# Patient Record
Sex: MALE | Race: WHITE | Employment: OTHER | URBAN - METROPOLITAN AREA
[De-identification: names, ages, dates, MRNs, and addresses within clinical notes are randomized per-mention and may not be internally consistent; named-entity substitution may affect disease eponyms.]

---

## 2022-11-03 ENCOUNTER — OFFICE VISIT (OUTPATIENT)
Dept: ORTHOPEDIC SURGERY | Age: 67
End: 2022-11-03
Payer: MEDICARE

## 2022-11-03 ENCOUNTER — HOSPITAL ENCOUNTER (OUTPATIENT)
Dept: MRI IMAGING | Age: 67
Discharge: HOME | End: 2022-11-06

## 2022-11-03 DIAGNOSIS — M25.522 LEFT ELBOW PAIN: ICD-10-CM

## 2022-11-03 DIAGNOSIS — S46.312A TRICEPS TENDON RUPTURE, LEFT, INITIAL ENCOUNTER: ICD-10-CM

## 2022-11-03 DIAGNOSIS — S46.312A TRICEPS TENDON RUPTURE, LEFT, INITIAL ENCOUNTER: Primary | ICD-10-CM

## 2022-11-03 DIAGNOSIS — M25.522 LEFT ELBOW PAIN: Primary | ICD-10-CM

## 2022-11-03 PROCEDURE — 99204 OFFICE O/P NEW MOD 45 MIN: CPT | Performed by: NURSE PRACTITIONER

## 2022-11-03 PROCEDURE — 1123F ACP DISCUSS/DSCN MKR DOCD: CPT | Performed by: NURSE PRACTITIONER
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
## 2022-11-03 NOTE — PERIOP NOTE
Patient verified name and . Order for consent not found in EHR; patient verifies procedure. Type 1B surgery, phone assessment complete. Orders not received. Labs per surgeon: none  Labs per anesthesia protocol: none    Patient answered medical/surgical history questions at their best of ability. All prior to admission medications documented in [facility]. Patient instructed to take the following medications the day of surgery according to anesthesia guidelines with a small sip of water: none On the day before surgery please take Acetaminophen 1000mg in the morning and then again before bed. You may substitute for Tylenol 650 mg. Hold all vitamins 7 days prior to surgery and NSAIDS 5 days prior to surgery. Prescription meds to hold:none  Patient instructed on the following:    > Arrive at [facility], time of arrival to be called the day before by 1700  > NPO after midnight, unless otherwise indicated, including gum, mints, and ice chips  > Responsible adult must drive patient to the hospital, stay during surgery, and patient will need supervision 24 hours after anesthesia  > Use soap in shower the night before surgery and on the morning of surgery  > All piercings must be removed prior to arrival.    > Leave all valuables (money and jewelry) at home but bring insurance card and ID on DOS.   > You may be required to pay a deductible or co-pay on the day of your procedure. You can pre-pay by calling [phone] if your surgery is at the [facility] or [phone] if your surgery is at the [facility]. > Do not wear make-up, nail polish, lotions, cologne, perfumes, powders, or oil on skin. Artificial nails are not permitted. post-procedure radiography performed/no pneumothorax/central line located in the superior vena cava

## 2022-11-04 DIAGNOSIS — S46.312A TRICEPS TENDON RUPTURE, LEFT, INITIAL ENCOUNTER: Primary | ICD-10-CM

## 2022-11-07 ENCOUNTER — HOSPITAL ENCOUNTER (OUTPATIENT)
Age: 67
Setting detail: OUTPATIENT SURGERY
Discharge: HOME | End: 2022-11-07
Attending: ORTHOPAEDIC SURGERY | Admitting: ORTHOPAEDIC SURGERY
Payer: MEDICARE

## 2022-11-07 ENCOUNTER — ANESTHESIA EVENT (OUTPATIENT)
Dept: SURGERY | Age: 67
End: 2022-11-07
Payer: MEDICARE

## 2022-11-07 ENCOUNTER — ANESTHESIA (OUTPATIENT)
Dept: SURGERY | Age: 67
End: 2022-11-07
Payer: MEDICARE

## 2022-11-07 VITALS
BODY MASS INDEX: 25.9 KG/M2 | HEIGHT: 67 IN | DIASTOLIC BLOOD PRESSURE: 65 MMHG | TEMPERATURE: 98.6 F | SYSTOLIC BLOOD PRESSURE: 108 MMHG | OXYGEN SATURATION: 96 % | WEIGHT: 165 LBS | RESPIRATION RATE: 16 BRPM | HEART RATE: 57 BPM

## 2022-11-07 DIAGNOSIS — S46.312A TRICEPS TENDON RUPTURE, LEFT, INITIAL ENCOUNTER: Primary | ICD-10-CM

## 2022-11-07 PROCEDURE — C1713 ANCHOR/SCREW BN/BN,TIS/BN: HCPCS | Performed by: ORTHOPAEDIC SURGERY

## 2022-11-07 PROCEDURE — 3600000014 HC SURGERY LEVEL 4 ADDTL 15MIN: Performed by: ORTHOPAEDIC SURGERY

## 2022-11-07 PROCEDURE — 6360000002 HC RX W HCPCS: Performed by: ANESTHESIOLOGY

## 2022-11-07 PROCEDURE — 7100000001 HC PACU RECOVERY - ADDTL 15 MIN: Performed by: ORTHOPAEDIC SURGERY

## 2022-11-07 PROCEDURE — C1769 GUIDE WIRE: HCPCS | Performed by: ORTHOPAEDIC SURGERY

## 2022-11-07 PROCEDURE — 7100000000 HC PACU RECOVERY - FIRST 15 MIN: Performed by: ORTHOPAEDIC SURGERY

## 2022-11-07 PROCEDURE — 3600000004 HC SURGERY LEVEL 4 BASE: Performed by: ORTHOPAEDIC SURGERY

## 2022-11-07 PROCEDURE — 6360000002 HC RX W HCPCS: Performed by: NURSE ANESTHETIST, CERTIFIED REGISTERED

## 2022-11-07 PROCEDURE — 7100000011 HC PHASE II RECOVERY - ADDTL 15 MIN: Performed by: ORTHOPAEDIC SURGERY

## 2022-11-07 PROCEDURE — 64418 NJX AA&/STRD SPRSCAP NRV: CPT | Performed by: ANESTHESIOLOGY

## 2022-11-07 PROCEDURE — 6360000002 HC RX W HCPCS: Performed by: NURSE PRACTITIONER

## 2022-11-07 PROCEDURE — 2709999900 HC NON-CHARGEABLE SUPPLY: Performed by: ORTHOPAEDIC SURGERY

## 2022-11-07 PROCEDURE — 24342 REPAIR OF RUPTURED TENDON: CPT | Performed by: ORTHOPAEDIC SURGERY

## 2022-11-07 PROCEDURE — 3700000001 HC ADD 15 MINUTES (ANESTHESIA): Performed by: ORTHOPAEDIC SURGERY

## 2022-11-07 PROCEDURE — 2580000003 HC RX 258: Performed by: ANESTHESIOLOGY

## 2022-11-07 PROCEDURE — 7100000010 HC PHASE II RECOVERY - FIRST 15 MIN: Performed by: ORTHOPAEDIC SURGERY

## 2022-11-07 PROCEDURE — 3700000000 HC ANESTHESIA ATTENDED CARE: Performed by: ORTHOPAEDIC SURGERY

## 2022-11-07 PROCEDURE — 2720000010 HC SURG SUPPLY STERILE: Performed by: ORTHOPAEDIC SURGERY

## 2022-11-07 DEVICE — HEALICOIL KNOTLESS RGNST
Type: IMPLANTABLE DEVICE | Site: ELBOW | Status: FUNCTIONAL
Brand: HEALICOIL

## 2022-11-07 DEVICE — HEALICOIL REGENESORB 5.5 MM                                    THREADED DILATOR, DISPOSABLE
Type: IMPLANTABLE DEVICE | Site: ELBOW | Status: FUNCTIONAL
Brand: HEALICOIL

## 2022-11-07 RX ORDER — ONDANSETRON 2 MG/ML
4 INJECTION INTRAMUSCULAR; INTRAVENOUS
Status: DISCONTINUED | OUTPATIENT
Start: 2022-11-07 | End: 2022-11-07 | Stop reason: HOSPADM

## 2022-11-07 RX ORDER — SODIUM CHLORIDE, SODIUM LACTATE, POTASSIUM CHLORIDE, CALCIUM CHLORIDE 600; 310; 30; 20 MG/100ML; MG/100ML; MG/100ML; MG/100ML
100 INJECTION, SOLUTION INTRAVENOUS CONTINUOUS
Status: DISCONTINUED | OUTPATIENT
Start: 2022-11-07 | End: 2022-11-07 | Stop reason: HOSPADM

## 2022-11-07 RX ORDER — MIDAZOLAM HYDROCHLORIDE 1 MG/ML
4 INJECTION, SOLUTION INTRAMUSCULAR; INTRAVENOUS
Status: COMPLETED | OUTPATIENT
Start: 2022-11-07 | End: 2022-11-07

## 2022-11-07 RX ORDER — ROPIVACAINE HYDROCHLORIDE 10 MG/ML
INJECTION EPIDURAL; INFILTRATION; PERINEURAL PRN
Status: DISCONTINUED | OUTPATIENT
Start: 2022-11-07 | End: 2022-11-07 | Stop reason: SDUPTHER

## 2022-11-07 RX ORDER — SODIUM CHLORIDE, SODIUM LACTATE, POTASSIUM CHLORIDE, CALCIUM CHLORIDE 600; 310; 30; 20 MG/100ML; MG/100ML; MG/100ML; MG/100ML
INJECTION, SOLUTION INTRAVENOUS CONTINUOUS
Status: DISCONTINUED | OUTPATIENT
Start: 2022-11-07 | End: 2022-11-07 | Stop reason: HOSPADM

## 2022-11-07 RX ORDER — SODIUM CHLORIDE 0.9 % (FLUSH) 0.9 %
5-40 SYRINGE (ML) INJECTION PRN
Status: DISCONTINUED | OUTPATIENT
Start: 2022-11-07 | End: 2022-11-07 | Stop reason: HOSPADM

## 2022-11-07 RX ORDER — SODIUM CHLORIDE 9 MG/ML
INJECTION, SOLUTION INTRAVENOUS PRN
Status: DISCONTINUED | OUTPATIENT
Start: 2022-11-07 | End: 2022-11-07 | Stop reason: HOSPADM

## 2022-11-07 RX ORDER — MIDAZOLAM HYDROCHLORIDE 1 MG/ML
INJECTION INTRAMUSCULAR; INTRAVENOUS PRN
Status: DISCONTINUED | OUTPATIENT
Start: 2022-11-07 | End: 2022-11-07 | Stop reason: SDUPTHER

## 2022-11-07 RX ORDER — FENTANYL CITRATE 50 UG/ML
INJECTION, SOLUTION INTRAMUSCULAR; INTRAVENOUS PRN
Status: DISCONTINUED | OUTPATIENT
Start: 2022-11-07 | End: 2022-11-07 | Stop reason: SDUPTHER

## 2022-11-07 RX ORDER — OXYCODONE HYDROCHLORIDE 5 MG/1
5 TABLET ORAL EVERY 4 HOURS PRN
Qty: 28 TABLET | Refills: 0 | Status: SHIPPED | OUTPATIENT
Start: 2022-11-07 | End: 2022-11-07

## 2022-11-07 RX ORDER — OXYCODONE HYDROCHLORIDE 5 MG/1
10 TABLET ORAL PRN
Status: DISCONTINUED | OUTPATIENT
Start: 2022-11-07 | End: 2022-11-07 | Stop reason: HOSPADM

## 2022-11-07 RX ORDER — OXYCODONE HYDROCHLORIDE 5 MG/1
5 TABLET ORAL PRN
Status: DISCONTINUED | OUTPATIENT
Start: 2022-11-07 | End: 2022-11-07 | Stop reason: HOSPADM

## 2022-11-07 RX ORDER — OXYCODONE HYDROCHLORIDE 5 MG/1
5 TABLET ORAL EVERY 4 HOURS PRN
Qty: 28 TABLET | Refills: 0 | Status: SHIPPED | OUTPATIENT
Start: 2022-11-07 | End: 2022-11-12

## 2022-11-07 RX ORDER — ONDANSETRON 4 MG/1
4 TABLET, ORALLY DISINTEGRATING ORAL EVERY 8 HOURS PRN
Qty: 20 TABLET | Refills: 0 | Status: SHIPPED | OUTPATIENT
Start: 2022-11-07

## 2022-11-07 RX ORDER — SODIUM CHLORIDE 0.9 % (FLUSH) 0.9 %
5-40 SYRINGE (ML) INJECTION EVERY 12 HOURS SCHEDULED
Status: DISCONTINUED | OUTPATIENT
Start: 2022-11-07 | End: 2022-11-07 | Stop reason: HOSPADM

## 2022-11-07 RX ORDER — ONDANSETRON 4 MG/1
4 TABLET, ORALLY DISINTEGRATING ORAL EVERY 8 HOURS PRN
Qty: 20 TABLET | Refills: 0 | Status: SHIPPED | OUTPATIENT
Start: 2022-11-07 | End: 2022-11-07

## 2022-11-07 RX ORDER — FENTANYL CITRATE 50 UG/ML
100 INJECTION, SOLUTION INTRAMUSCULAR; INTRAVENOUS
Status: COMPLETED | OUTPATIENT
Start: 2022-11-07 | End: 2022-11-07

## 2022-11-07 RX ORDER — HYDROMORPHONE HYDROCHLORIDE 2 MG/ML
0.5 INJECTION, SOLUTION INTRAMUSCULAR; INTRAVENOUS; SUBCUTANEOUS EVERY 5 MIN PRN
Status: DISCONTINUED | OUTPATIENT
Start: 2022-11-07 | End: 2022-11-07 | Stop reason: HOSPADM

## 2022-11-07 RX ORDER — LIDOCAINE HYDROCHLORIDE 10 MG/ML
1 INJECTION, SOLUTION INFILTRATION; PERINEURAL
Status: DISCONTINUED | OUTPATIENT
Start: 2022-11-07 | End: 2022-11-07 | Stop reason: HOSPADM

## 2022-11-07 RX ORDER — PROPOFOL 10 MG/ML
INJECTION, EMULSION INTRAVENOUS CONTINUOUS PRN
Status: DISCONTINUED | OUTPATIENT
Start: 2022-11-07 | End: 2022-11-07 | Stop reason: SDUPTHER

## 2022-11-07 RX ADMIN — FENTANYL CITRATE 50 MCG: 50 INJECTION, SOLUTION INTRAMUSCULAR; INTRAVENOUS at 11:24

## 2022-11-07 RX ADMIN — PROPOFOL 140 MCG/KG/MIN: 10 INJECTION, EMULSION INTRAVENOUS at 10:57

## 2022-11-07 RX ADMIN — Medication 2000 MG: at 10:48

## 2022-11-07 RX ADMIN — MEPIVACAINE HYDROCHLORIDE 15 ML: 15 INJECTION, SOLUTION EPIDURAL; INFILTRATION at 09:48

## 2022-11-07 RX ADMIN — FENTANYL CITRATE 50 MCG: 50 INJECTION, SOLUTION INTRAMUSCULAR; INTRAVENOUS at 10:59

## 2022-11-07 RX ADMIN — ROPIVACAINE HYDROCHLORIDE 15 ML: 10 INJECTION, SOLUTION EPIDURAL at 09:48

## 2022-11-07 RX ADMIN — FENTANYL CITRATE 50 MCG: 50 INJECTION, SOLUTION INTRAMUSCULAR; INTRAVENOUS at 09:48

## 2022-11-07 RX ADMIN — MIDAZOLAM 3 MG: 1 INJECTION INTRAMUSCULAR; INTRAVENOUS at 09:51

## 2022-11-07 RX ADMIN — SODIUM CHLORIDE, SODIUM LACTATE, POTASSIUM CHLORIDE, AND CALCIUM CHLORIDE: 600; 310; 30; 20 INJECTION, SOLUTION INTRAVENOUS at 10:35

## 2022-11-07 RX ADMIN — MIDAZOLAM 2 MG: 1 INJECTION INTRAMUSCULAR; INTRAVENOUS at 10:48

## 2022-11-07 NOTE — ANESTHESIA PROCEDURE NOTES
Peripheral Block    Patient location during procedure: procedure area  Reason for block: post-op pain management and at surgeon's request  Start time: 11/7/2022 9:48 AM  End time: 11/7/2022 9:52 AM  Staffing  Performed: anesthesiologist   Anesthesiologist: Haleigh De La Rosa MD  Preanesthetic Checklist  Completed: patient identified, IV checked, site marked, risks and benefits discussed, surgical/procedural consents, equipment checked, pre-op evaluation, timeout performed, anesthesia consent given, oxygen available, monitors applied/VS acknowledged, fire risk safety assessment completed and verbalized and blood product R/B/A discussed and consented  Peripheral Block   Patient position: supine  Prep: ChloraPrep  Provider prep: mask and sterile gloves  Patient monitoring: cardiac monitor, continuous pulse ox, frequent blood pressure checks, IV access, oxygen and responsive to questions  Block type: Suprascapular  Laterality: left  Injection technique: single-shot  Guidance: nerve stimulator and ultrasound guided    Needle   Needle type: insulated echogenic nerve stimulator needle   Needle gauge: 20 G  Needle localization: nerve stimulator and ultrasound guidance  Needle insertion depth: 3 cm  Needle length: 5 cm  Assessment   Injection assessment: negative aspiration for heme, no paresthesia on injection, local visualized surrounding nerve on ultrasound and no intravascular symptoms  Paresthesia pain: none  Slow fractionated injection: yes  Hemodynamics: stable  Real-time US image taken/store: yes  Outcomes: uncomplicated    Additional Notes  Potential access sites were examined with ultrasound and the acceptable patent access site was selected (site noted above). The needle path and vein access were visualized in real time using ultrasonography, and an image was recorded for permanent record.      15 cc 1% Ropivacaine + 1.5% Mepivacaine with 4 mg decadron and epi  Medications Administered  dexamethasone 4 MG/ML - Perineural   4 mg - 11/7/2022 9:48:00 AM  mepivacaine 1.5 % - Perineural   15 mL - 11/7/2022 9:48:00 AM

## 2022-11-07 NOTE — INTERVAL H&P NOTE
H&P Update:  Jeronimo Fields was seen and examined. History and physical has been reviewed. The patient has been examined.  There have been no significant clinical changes since the completion of the originally dated History and Physical.    Arun Zheng MD  Orthopaedic Surgery  11/07/22  8:55 AM

## 2022-11-07 NOTE — ANESTHESIA PRE PROCEDURE
Department of Anesthesiology  Preprocedure Note       Name:  Fox Pierce   Age:  78 y.o.  :  1955                                          MRN:  [id number]         Date:  2022      Surgeon: Yasemin Gonzalez):  Anitra Lemon MD    Procedure: Procedure(s):  TRICEPS TENDON REPAIR    Medications prior to admission:   Prior to Admission medications    Not on File       Current medications:    Current Facility-Administered Medications   Medication Dose Route Frequency Provider Last Rate Last Admin    lidocaine 1 % injection 1 mL  1 mL IntraDERmal Once PRN Blayne Corcoran MD        fentaNYL (SUBLIMAZE) injection 100 mcg  100 mcg IntraVENous Once PRN Blayne Corcoran MD        lactated ringers infusion  100 mL/hr IntraVENous Continuous Blayne Corcoran MD        midazolam PF (VERSED) injection 4 mg  4 mg IntraVENous Once PRN Blayne Corcoran MD        ceFAZolin (ANCEF) 2000 mg in sterile water 20 mL IV syringe  2,000 mg IntraVENous On Call to [address], APRN - CNP        sodium chloride flush 0.9 % injection 5-40 mL  5-40 mL IntraVENous 2 times per day Zeinab Alves APRN - CNP        sodium chloride flush 0.9 % injection 5-40 mL  5-40 mL IntraVENous PRN Vera Bettencourt APRN - CNP        0.9 % sodium chloride infusion   IntraVENous PRN Yolanda Bettencourt APRN - CNP           Allergies:  No Known Allergies    Problem List:    Patient Active Problem List   Diagnosis Code    Inguinal strain J87.286V    Triceps tendon rupture, left, initial encounter Y86.027L    Left elbow pain M25.522       Past Medical History:  History reviewed. No pertinent past medical history.     Past Surgical History:        Procedure Laterality Date    APPENDECTOMY      TONSILLECTOMY         Social History:    Social History     Tobacco Use    Smoking status: Never    Smokeless tobacco: Never   Substance Use Topics    Alcohol use: Yes     Comment: 1 pint a week                                Counseling given: Not Answered      Vital Signs (Current):   Vitals:    11/03/22 1440 11/07/22 0846   BP:  119/63   Pulse:  60   Resp:  18   Temp:  98.3 °F (36.8 °C)   TempSrc:  Oral   SpO2:  97%   Weight: 165 lb (74.8 kg) 165 lb (74.8 kg)   Height: 5' 7\" (1.702 m)                                               BP Readings from Last 3 Encounters:   11/07/22 119/63       NPO Status:                                                                                 BMI:   Wt Readings from Last 3 Encounters:   11/07/22 165 lb (74.8 kg)   11/03/22 165 lb (74.8 kg)     Body mass index is 25.84 kg/m². CBC: No results found for: WBC, RBC, HGB, HCT, MCV, RDW, PLT    CMP: No results found for: NA, K, CL, CO2, BUN, CREATININE, GFRAA, AGRATIO, LABGLOM, GLUCOSE, GLU, PROT, CALCIUM, BILITOT, ALKPHOS, AST, ALT    POC Tests: No results for input(s): POCGLU, POCNA, POCK, POCCL, POCBUN, POCHEMO, POCHCT in the last 72 hours. Coags: No results found for: PROTIME, INR, APTT    HCG (If Applicable): No results found for: PREGTESTUR, PREGSERUM, HCG, HCGQUANT     ABGs: No results found for: PHART, PO2ART, BZU8DIK, DOE3KEP, BEART, Q7IMQEHP     Type & Screen (If Applicable):  No results found for: LABABO, LABRH    Drug/Infectious Status (If Applicable):  No results found for: HIV, HEPCAB    COVID-19 Screening (If Applicable): No results found for: COVID19        Anesthesia Evaluation  Patient summary reviewed  Airway: Mallampati: II  TM distance: >3 FB   Neck ROM: full  Mouth opening: > = 3 FB   Dental:          Pulmonary:Negative Pulmonary ROS and normal exam                               Cardiovascular:Negative CV ROS  Exercise tolerance: good (>4 METS),                     Neuro/Psych:   Negative Neuro/Psych ROS              GI/Hepatic/Renal: Neg GI/Hepatic/Renal ROS            Endo/Other: Negative Endo/Other ROS                    Abdominal:             Vascular: negative vascular ROS.          Other Findings:           Anesthesia Plan      TIVA     ASA 1 Anesthetic plan and risks discussed with patient.               Post-op pain plan if not by surgeon: single peripheral nerve block            Bree Willson MD   11/7/2022

## 2022-11-07 NOTE — OP NOTE
ORTHOPAEDIC SURGICAL NOTE        Adolfo Taylor male 78 y.o.  [id number]   11/7/2022     PRE-OP DIAGNOSIS: Rupture of left triceps tendon, initial encounter [S46.319A]   POST-OP DIAGNOSIS: Same  LATERALITY: Left     PROCEDURES PERFORMED:   Left distal triceps tendon repair       SURGEON:   Diandra Rao MD, PhD     IMPLANTS:   Implant Name Type Inv. Item Serial No.  Lot No. LRB No. Used Action   ANCHOR N SUT FIX 5.5 MM SZ - [id number]  ANCHOR N SUT FIX 5.5 MM SZ  [employer] [id number] Left 1 Implanted   ANCHOR SUTURE 5.5 MM KNOTLESS HEAL[employer] - [id number]  ANCHOR SUTURE 5.5 MM KNOTLESS HEALICOIL  [employer] [id number] Left 1 Implanted      Procedure(s):  TRICEPS TENDON REPAIR   Surgeon(s):  Rama Mo MD   Procedure(s):  TRICEPS TENDON REPAIR     ANESTHESIA: Regional     STAFF:    Circulator: Ruth Membreno RN  Scrub Person First: Heidy Doe     ESTIMATED BLOOD LOSS: Minimal       TOTAL IV FLUIDS : See anesthesia note    COMPLICATIONS: None     DRAINS:       TOURNIQUET TIME:   Total Tourniquet Time Documented:  Arm  (Left) - 32 minutes  Total: Arm  (Left) - 32 minutes       INDICATION FOR PROCEDURE:     Adolfo Taylor sustained a rupture of the left distal triceps tendon from the olecranon. Surgical and non-surgical treatment options were discussed with the patient and their family, as well as the risk and benefits of each option. After thorough discussion, the patient decided to proceed with surgical management. Specific to this treatment plan, we discussed in detail surgical risks including scar, pain, bleeding, infection, anesthetic risks, neurovascular injury, failure to achieve desired results, hardware problems, need for further surgery,  weakness, stiffness, risk of death and potential risk of other unforseen complication. The Patient consented to the procedure after discussion of the risks and benefits.          DESCRIPTION OF PROCEDURE:     The patient was identified in the holding room. The left elbow was marked and confirmed as the correct operative site. They were then brought to the OR and transferred onto the OR table in the supine position. All bony prominences were well padded. SCDs were placed on the bilateral legs throughout the case. A timeout was performed, verifying the correct patient, the correct side  and the correct procedure. Antibiotics were then administered, and were redosed during the procedure as needed at indicated intervals. A non-sterile tourniquet was placed on the arm. The upper extremity was pre scrubbed and then prepped and draped in routine sterile fashion. An incisional timeout was performed re-confirming the correct patient, surgical site and procedure, as well as verifying antibiotics.      A posterior incision was made on the elbow, this was curved radially to avoid pressure over the olecranon tip, Bovie dissection was carried down to the proximal ulna as well as to the triceps fascia, the triceps tendon was identified, this was retracted about 3 cm, this was debrided with a rongeur, remnants of the tendon were debrided off the tip of the olecranon, this was done with a rongeur, the triceps was released on its anterior, posterior, ulnar and radial side, this was mobilized and the triceps was able to reach the olecranon while extending the elbow, 2 [place] stitches were applied using number 2 fiber tape, a medial and lateral perforation were performed on the ulnar wall staying outside of the joint, the sutures were passed as well as a loop suture, one suture from the medial and lateral side were then repassed to the medial side, the same procedure was performed on the lateral side, this was done as a footprint tensioning technique, all 4 sutures were then secured to the posterior aspect of the ulna with a 5.0 mm [employer] screw, this was done while tensioning all the sutures and maintaining the elbow extended. At the end of the procedure the triceps demonstrated good tension and I was able to bend the elbow to 90 degrees without any displacement of the triceps from its footprint    The tourniquet was deflated and hemostasis was ensured. The wounds were then thoroughly irrigated and closed in layered fashion with 4-0 Vicryl and 4-0 staples. A sterile dressing was applied followed by a  splint     The patient was awakened and taken to PACU in stable condition. All sponge and needle counts were correct at the end of the case. I was present and scrubbed for the entire procedure. DISPOSITION:    The patient will be discharged home. Weightbearing status: NWB       CHEMICAL VTE (DVT) PROPHYLAXIS: None warranted for this case     FOLLOW-UP:  10-14 days for wound check and XRs if needed.      Penny Escalante MD    11/07/22  2:45 PM

## 2022-11-09 NOTE — ANESTHESIA POSTPROCEDURE EVALUATION
Department of Anesthesiology  Postprocedure Note    Patient: Flex Muñiz  MRN: [id number]  YOB: 1955  Date of evaluation: 11/9/2022      Procedure Summary     Date: 11/07/22 Room / Location: [facility] OP OR 06 / [facility]    Anesthesia Start: 1040 Anesthesia Stop: 0010    Procedure: TRICEPS TENDON REPAIR (Left: Elbow) Diagnosis:       Rupture of left triceps tendon, initial encounter      (Rupture of left triceps tendon, initial encounter [W50.566D])    Surgeons: Adrian Contreras MD Responsible Provider: Patricia Collins MD    Anesthesia Type: TIVA ASA Status: 1          Anesthesia Type: No value filed.     Sadie Phase I: Sadie Score: 7    Sadie Phase II: Sadie Score: 9      Anesthesia Post Evaluation    Patient location during evaluation: PACU  Patient participation: complete - patient participated  Level of consciousness: awake  Pain score: 0  Airway patency: patent  Nausea & Vomiting: no nausea and no vomiting  Complications: no  Cardiovascular status: blood pressure returned to baseline and hemodynamically stable  Respiratory status: acceptable, spontaneous ventilation and nonlabored ventilation  Hydration status: euvolemic  Multimodal analgesia pain management approach

## 2022-11-18 ENCOUNTER — OFFICE VISIT (OUTPATIENT)
Dept: ORTHOPEDIC SURGERY | Age: 67
End: 2022-11-18
Payer: MEDICARE

## 2022-11-18 DIAGNOSIS — S46.312A TRICEPS TENDON RUPTURE, LEFT, INITIAL ENCOUNTER: Primary | ICD-10-CM

## 2022-11-18 DIAGNOSIS — M25.522 LEFT ELBOW PAIN: ICD-10-CM

## 2022-11-18 PROCEDURE — 99024 POSTOP FOLLOW-UP VISIT: CPT | Performed by: NURSE PRACTITIONER

## 2022-11-18 PROCEDURE — L3760 EO ADJ JT PREFAB CUSTOM FIT: HCPCS | Performed by: NURSE PRACTITIONER

## 2022-11-18 PROCEDURE — 97760 ORTHOTIC MGMT&TRAING 1ST ENC: CPT | Performed by: OCCUPATIONAL THERAPIST

## 2022-11-18 NOTE — LETTER
DME Patient Authorization Form    Name: Helon Severs  : 1955  MRN: [id number]   Age: 78 y.o. Gender: male  Delivery Address: [facility]     Diagnosis:     ICD-10-CM    1. Triceps tendon rupture, left, initial encounter  S46.312A            Requested DME:  IROM Elbow- ($525.00) X 1 - left        Clinical Notes:     **Indicates non-covered items by insurance. Payment expected on date of service. Electronically signed by  Provider: ELVER Duran - CNP__Date: 2022                            [address] Tax ID # [id number]        Durable Medical Equipment and/or Orthotics Patient Consent     I understand that my physician has prescribed this medical supply as part of my treatment plan as a matter of Medical Necessity.  I understand that I have a choice in where I receive my prescribed orthopedic supplies and/or services.  I authorize [facility] to furnish this service/product and to provide my insurance carrier with any information requested in order to process for payment.  I instruct my insurance carrier to pay [facility] directly for these services/products.  I understand that my insurance carrier may deny payment for this supply because it is a non-covered item, deemed not medically necessary or considered experimental.   I understand that any cost not covered by my insurance carrier will be solely my financial responsibility.  I have received the Supplier Standards and have reviewed them.  I have received the prescribed item and have been fully instructed on the proper use of the above services/products.    ______ (Patient Initials) I understand that all DME items are non-returnable after being dispensed. Items still in sealed packaging may be returned up to 14 days after purchasing.  [address] will replace items that are defective.    ______ (Patient Initials) I understand that Mary Doyle will not file a claim with my insurance carrier for this service/product and I am waiving my right to file a claim on my own for this service/product with my insurance company as this item is NON-COVERED (Denoted by the **) by my Insurance company/policy. ______ (Patient Initials) I understand that I am responsible to bring my equipment to the hospital for any surgery. ______________________________________________  ________________________  Patient / Pepito Talavera            Thank you for considering [address] Your physician has prescribed specific medical equipment or devices for your home use. The following describes your rights and responsibilities as our customer. Right to Choose Providers: You have a choice regarding which company supplies your home medical equipment and devices, and to consult your physician in this decision. You may choose a medical supply store, a home medical equipment provider, or a specialist such as [facility]. [facility] will coordinate with your physician to provide the medical equipment or devices prescribed for your home use. Right to Service:  You have the right to considerate, respectful and nondiscriminatory care. You have the right to receive accurate and easily understood information about your health care. If you speak a foreign language, or don't understand the discussions, assistance will be provided to allow you to make informed health care decisions. You have the right to know your treatment options and to participate in decisions about your care, including the right to accept or refuse treatment. You have the right to expect a reasonable response to your requests for treatment or service.   You have the right to talk in confidence with health care providers and to have your health care information protected. You have the right to receive an explanation of your bill. You have the right to complain about the service or product you receive. Patient Responsibilities:  Please provide complete and accurate information about your health insurance benefits and make arrangements for the timely payment of your bill. [facility] will, if possible, assume responsibility for billing your insurance (Medicare, Medicaid and commercial) for the prescribed equipment or devices. If your policy does not cover the prescribed product, or only covers a portion of the bill, you are responsible for any remaining balance. Return and Exchange Policy:  [facility] will honor published  Warranties for products. [facility] will accept returns or exchanges within 14 days from the date of receipt, providin) the product must be in new condition; 2) receipt as required; and 3) used disposable and hygiene products may only be returned due to a defective product. Note: Refunds will be issued in a timely manner, please allow 4-6 weeks for processing. Complaint Procedures and DME Consumer Protection Resources:  [facility] values you as a customer, and is committed to resolving patient concerns. This commitment includes understanding and documenting your concerns, conducting a review of internal procedures, and providing you with an explanation and resolution to your concerns. Should you have any questions about our services or billing process, please contact our office at (practice phone number). If we are unable to resolve the concern, you have the right to direct comments to the office of Consumer Protection, in the [address] or the [facility] office, without fear of repercussion. DMEPOS SUPPLIER STANDARDS    A supplier must be in compliance with all applicable Federal and [employer] and regulatory requirements.   A supplier must provide complete and accurate information on the DMEPOS supplier application. Any changes to this information must be reported to the [facility] within 30 days. An authorized individual (one whose signature is binding) must sign the application for billing privileges. A supplier must fill orders from its own inventory, or must contract with other companies for the purchase of items necessary to fill the order. A supplier may not contract with any entity that is currently excluded from the Medicare program, any [facility] program, or from any other Federal procurement or Nonprocurement programs. A supplier must advise beneficiaries that they may rent or purchase inexpensive or routinely purchased durable medical equipment, and of the purchase option for capped rental equipment. A supplier must notify beneficiaries of warranty coverage and honor all warranties under applicable State Law, and repair or replace free of charge Medicare covered items that are under warranty. A supplier must maintain a physical facility on an appropriate site. A supplier must permit CMS, or its agents to conduct on-site inspections to ascertain the supplier's compliance with these standards. The supplier location must be accessible to beneficiaries during reasonable business hours, and must maintain a visible sign and posted hours of operation. A supplier must maintain a primary business telephone listed under the name of the business in a [employer] or a toll free number available through directory assistance. The exclusive use of a beeper, answering machine or cell phone is prohibited. A supplier must have comprehensive liability insurance in the amount of at least $300,000 that covers both the supplier's place of business and all customers and employees of the supplier. If the supplier manufactures its own items, this insurance must also cover product liability and completed operations.   A supplier must agree not to initiate telephone contact with beneficiaries, with a few exceptions allowed. This standard prohibits suppliers from calling beneficiaries in order to solicit new business. A supplier is responsible for delivery and must instruct beneficiaries on use of Medicare covered items, and maintain proof of delivery. A supplier must answer questions, and respond to complaints of the beneficiaries, and maintain documentation of such contacts. A supplier must maintain and replace at no charge or repair directly, or through a service contract with another company, Medicare covered items it has rented to beneficiaries. A supplier must accept returns of substandard (less than full quality for the particular item) or unsuitable items (inappropriate for the beneficiary at the time it was fitted and rented or sold) from beneficiaries. A supplier must disclose these supplier standards to each beneficiary to whom it supplies a Medicare-covered item. A supplier must disclose to the government any person having ownership, financial, or control interest in the supplier. A supplier must not convey or reassign a supplier number; i.e., the supplier may not sell or allow another entity to use its Medicare billing number. A supplier must have a complaint resolution protocol established to address beneficiary complaints that relate to these standards. A record of these complaints must be maintained at the physical facility. Complaint records must include: the name, address, telephone number and health insurance claim number of the beneficiary, a summary of the complaint, and any action taken to resolve it. A supplier must agree to furnish CMS any information required by the Medicare statute and implementing regulations. A supplier of DMEPOS and other items and services must be accredited by a CMS-approved accreditation organization in order to receive and retain a supplier billing number.  The accreditation must indicate the specific products and services, for which the supplier is accredited in order for the supplier to receive payment for those specific products and services. A DMEPOS supplier must notify their accreditation organization when a new DMEPOS location is opened. The accreditation organization may accredit the new supplier location for three months after it is operational without requiring a new site visit. All DMEPOS supplier locations, whether owned or subcontracted, must meet the [employer] and be separately accredited in order to bill Medicare. An accredited supplier may be denied enrollment or their enrollment may be revoked, if CMS determines that they are not in compliance with the DMEPOS quality standards. A DMEPOS supplier must disclose upon enrollment all products and services, including the addition of new product lines for which they are seeking accreditation. If a new product line is added after enrollment, the DMEPOS supplier will be responsible for notifying the accrediting body of the new product so that the DMEPOS supplier can be re-surveyed and accredited for these new products. Must meet the surety bond requirements specified in 42 C. F.R. 424.57(c). Implementation date- May 4, 2009. A supplier must obtain oxygen from a state-licensed oxygen supplier. A supplier must maintain ordering and referring documentation consistent with provisions found in 42 C. F.R. 424.516(f). DMEPOS suppliers are prohibited from sharing a practice location with certain other Medicare providers and suppliers. DMEPOS suppliers must remain open to the public for a minimum of 30 hours per week with certain exceptions.

## 2022-11-18 NOTE — PROGRESS NOTES
Patient was fitted and instructed on an IROM Elbow Brace for the left elbow. Patient read and signed documenting they understand and agree to [facility]'s current DME return policy.
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
## 2022-11-18 NOTE — PROGRESS NOTES
Orthopaedic Hand Clinic Note    Name: Navi Lewis  YOB: 1955  Gender: male  MRN: [id number]      Follow up visit:   1. Triceps tendon rupture, left, initial encounter        HPI: Navi Lewis is a 78 y.o. male who is following up for left tendon triceps repair. He is 11 days out from surgery. He is doing very well. He is not taking any pain medication. .      ROS/Meds/PSH/PMH/FH/SH: I personally reviewed the patients standard intake form. Pertinents are discussed in the HPI    Physical Examination:    Musculoskeletal Examination:  Examination on the left upper extremity demonstrates cap refill < 5 seconds in all fingers,  Patient has expected postoperative swelling and ecchymosis to the left elbow. He is Staples are intact. There is no erythema or drainage. He denies paresthesia. He has good capillary refill. .    Imaging / Electrodiagnostic Tests:     None    Assessment:     ICD-10-CM    1. Triceps tendon rupture, left, initial encounter  S46.312A           Plan:   We discussed the diagnosis and different treatment options. We discussed observation, therapy, antiinflammatory medications and other pertinent treatment modalities. After discussing in detail the patient elects to proceed with removing his staples today and applying Steri-Strips. He will work with therapy today he will follow a triceps tendon repair protocol. He will be in a hinged brace. We will reassess his progress back in 4 weeks. We have discussed activity restrictions in detail such as no heavy lifting pushing pulling. Patient voiced accordance and understanding of the information provided and the formulated plan. All questions were answered to the patient's satisfaction during the encounter.     Treatment at this time:  Therapy and brace    ELVER Hu CNP  Orthopaedic Surgery  11/18/22  11:58 AM

## 2022-11-28 ENCOUNTER — TELEPHONE (OUTPATIENT)
Dept: ORTHOPEDIC SURGERY | Age: 67
End: 2022-11-28

## 2022-11-28 NOTE — TELEPHONE ENCOUNTER
----- Message from Blayne Baker sent at 11/28/2022 12:16 PM EST -----  Regarding: my triceps recovery  Vera,    Please understand that the pain is not constant, but if I move my arm or body the wrong way then I get a shot of pain (about an 8 or 9). I am concerned that it may indicate that something serious is wrong - although I have no idea what that might be. Could we talk over the phone before we schedule another appointment. E-mail is not the most effective way to communicate.       Thanks,  Blayne Walsh  [phone]

## 2022-11-29 ENCOUNTER — OFFICE VISIT (OUTPATIENT)
Dept: ORTHOPEDIC SURGERY | Age: 67
End: 2022-11-29

## 2022-11-29 DIAGNOSIS — S46.312A TRICEPS TENDON RUPTURE, LEFT, INITIAL ENCOUNTER: Primary | ICD-10-CM

## 2022-11-29 DIAGNOSIS — M25.522 LEFT ELBOW PAIN: ICD-10-CM

## 2022-11-29 PROCEDURE — 99024 POSTOP FOLLOW-UP VISIT: CPT | Performed by: NURSE PRACTITIONER

## 2022-12-20 ENCOUNTER — OFFICE VISIT (OUTPATIENT)
Dept: ORTHOPEDIC SURGERY | Age: 67
End: 2022-12-20

## 2022-12-20 DIAGNOSIS — S46.312A TRICEPS TENDON RUPTURE, LEFT, INITIAL ENCOUNTER: Primary | ICD-10-CM

## 2022-12-20 DIAGNOSIS — M25.522 LEFT ELBOW PAIN: ICD-10-CM

## 2022-12-20 PROCEDURE — 99024 POSTOP FOLLOW-UP VISIT: CPT | Performed by: NURSE PRACTITIONER

## 2022-12-20 NOTE — PROGRESS NOTES
Orthopaedic Hand Clinic Note    Name: Dave Fenton  YOB: 1955  Gender: male  MRN: [id number]      Follow up visit:   1. Triceps tendon rupture, left, initial encounter    2. Left elbow pain        HPI: Dave Fenton is a 78 y.o. male who is following up for left distal triceps tendon repair. He is 6 weeks out from surgery. He has been working with [employer] for therapy. He has been in his hinged brace. He has followed his activity restrictions. .      ROS/Meds/PSH/PMH/FH/SH: I personally reviewed the patients standard intake form. Pertinents are discussed in the HPI    Physical Examination:    Musculoskeletal Examination:  Examination on the left upper extremity demonstrates cap refill < 5 seconds in all fingers  She has a well-healed incision. He has range of motion from 10 degrees of extension to 130 degrees of flexion. He has 90 degrees of pronation and supination. He is able to make a composite fist.  He has good capillary refill. .    Imaging / Electrodiagnostic Tests:     None    Assessment:     ICD-10-CM    1. Triceps tendon rupture, left, initial encounter  S46.312A       2. Left elbow pain  M25.522           Plan:   We discussed the diagnosis and different treatment options. We discussed observation, therapy, antiinflammatory medications and other pertinent treatment modalities. After discussing in detail the patient elects to proceed with continuing therapy. He can now wean out of his hinged brace. They can start lengthening and therapy. We will see him back in 2 months. He can cancel that appointment if he is doing well. .     Patient voiced accordance and understanding of the information provided and the formulated plan. All questions were answered to the patient's satisfaction during the encounter.     Treatment at this time:  [address], APRN - CNP  Orthopaedic Surgery  12/20/22  2:02 PM Cartilage Graft Text: The defect edges were debeveled with a #15 scalpel blade.  Given the location of the defect, shape of the defect, the fact the defect involved a full thickness cartilage defect a cartilage graft was deemed most appropriate.  An appropriate donor site was identified, cleansed, and anesthetized. The cartilage graft was then harvested and transferred to the recipient site, oriented appropriately and then sutured into place.  The secondary defect was then repaired using a primary closure.

## 2022-12-28 ENCOUNTER — PATIENT MESSAGE (OUTPATIENT)
Dept: ORTHOPEDIC SURGERY | Age: 67
End: 2022-12-28

## 2023-03-03 ENCOUNTER — CLINICAL DOCUMENTATION (OUTPATIENT)
Age: 68
End: 2023-03-03

## 2025-05-03 SDOH — HEALTH STABILITY: PHYSICAL HEALTH: ON AVERAGE, HOW MANY MINUTES DO YOU ENGAGE IN EXERCISE AT THIS LEVEL?: 120 MIN

## 2025-05-03 SDOH — HEALTH STABILITY: PHYSICAL HEALTH: ON AVERAGE, HOW MANY DAYS PER WEEK DO YOU ENGAGE IN MODERATE TO STRENUOUS EXERCISE (LIKE A BRISK WALK)?: 5 DAYS

## 2025-05-03 ASSESSMENT — LIFESTYLE VARIABLES
HOW OFTEN DURING THE LAST YEAR HAVE YOU FAILED TO DO WHAT WAS NORMALLY EXPECTED FROM YOU BECAUSE OF DRINKING: NEVER
HOW OFTEN DURING THE LAST YEAR HAVE YOU HAD A FEELING OF GUILT OR REMORSE AFTER DRINKING: NEVER
HOW OFTEN DO YOU HAVE A DRINK CONTAINING ALCOHOL: 4 OR MORE TIMES A WEEK
HOW MANY STANDARD DRINKS CONTAINING ALCOHOL DO YOU HAVE ON A TYPICAL DAY: 1 OR 2
HOW OFTEN DURING THE LAST YEAR HAVE YOU BEEN UNABLE TO REMEMBER WHAT HAPPENED THE NIGHT BEFORE BECAUSE YOU HAD BEEN DRINKING: NEVER
HOW OFTEN DO YOU HAVE SIX OR MORE DRINKS ON ONE OCCASION: 1
HOW OFTEN DURING THE LAST YEAR HAVE YOU FOUND THAT YOU WERE NOT ABLE TO STOP DRINKING ONCE YOU HAD STARTED: NEVER
HOW OFTEN DO YOU HAVE A DRINK CONTAINING ALCOHOL: 5
HAS A RELATIVE, FRIEND, DOCTOR, OR ANOTHER HEALTH PROFESSIONAL EXPRESSED CONCERN ABOUT YOUR DRINKING OR SUGGESTED YOU CUT DOWN: NO
HAVE YOU OR SOMEONE ELSE BEEN INJURED AS A RESULT OF YOUR DRINKING: NO
HOW OFTEN DURING THE LAST YEAR HAVE YOU FOUND THAT YOU WERE NOT ABLE TO STOP DRINKING ONCE YOU HAD STARTED: NEVER
HOW OFTEN DURING THE LAST YEAR HAVE YOU BEEN UNABLE TO REMEMBER WHAT HAPPENED THE NIGHT BEFORE BECAUSE YOU HAD BEEN DRINKING: NEVER
HOW OFTEN DURING THE LAST YEAR HAVE YOU NEEDED AN ALCOHOLIC DRINK FIRST THING IN THE MORNING TO GET YOURSELF GOING AFTER A NIGHT OF HEAVY DRINKING: NEVER
HOW OFTEN DURING THE LAST YEAR HAVE YOU HAD A FEELING OF GUILT OR REMORSE AFTER DRINKING: NEVER
HOW MANY STANDARD DRINKS CONTAINING ALCOHOL DO YOU HAVE ON A TYPICAL DAY: 1
HAVE YOU OR SOMEONE ELSE BEEN INJURED AS A RESULT OF YOUR DRINKING: NO
HAS A RELATIVE, FRIEND, DOCTOR, OR ANOTHER HEALTH PROFESSIONAL EXPRESSED CONCERN ABOUT YOUR DRINKING OR SUGGESTED YOU CUT DOWN: NO
HOW OFTEN DURING THE LAST YEAR HAVE YOU NEEDED AN ALCOHOLIC DRINK FIRST THING IN THE MORNING TO GET YOURSELF GOING AFTER A NIGHT OF HEAVY DRINKING: NEVER
HOW OFTEN DURING THE LAST YEAR HAVE YOU FAILED TO DO WHAT WAS NORMALLY EXPECTED FROM YOU BECAUSE OF DRINKING: NEVER

## 2025-05-03 ASSESSMENT — PATIENT HEALTH QUESTIONNAIRE - PHQ9
SUM OF ALL RESPONSES TO PHQ QUESTIONS 1-9: 1
2. FEELING DOWN, DEPRESSED OR HOPELESS: NOT AT ALL
SUM OF ALL RESPONSES TO PHQ QUESTIONS 1-9: 1
SUM OF ALL RESPONSES TO PHQ QUESTIONS 1-9: 1
1. LITTLE INTEREST OR PLEASURE IN DOING THINGS: SEVERAL DAYS
SUM OF ALL RESPONSES TO PHQ QUESTIONS 1-9: 1

## 2025-05-05 ENCOUNTER — OFFICE VISIT (OUTPATIENT)
Dept: PRIMARY CARE CLINIC | Facility: CLINIC | Age: 70
End: 2025-05-05
Payer: MEDICARE

## 2025-05-05 VITALS
WEIGHT: 172.7 LBS | TEMPERATURE: 98.4 F | OXYGEN SATURATION: 96 % | HEART RATE: 67 BPM | SYSTOLIC BLOOD PRESSURE: 109 MMHG | DIASTOLIC BLOOD PRESSURE: 62 MMHG | HEIGHT: 67 IN | BODY MASS INDEX: 27.11 KG/M2

## 2025-05-05 DIAGNOSIS — Z76.89 ENCOUNTER TO ESTABLISH CARE WITH NEW DOCTOR: Primary | ICD-10-CM

## 2025-05-05 DIAGNOSIS — E55.9 VITAMIN D DEFICIENCY: ICD-10-CM

## 2025-05-05 DIAGNOSIS — G89.29 CHRONIC RIGHT SHOULDER PAIN: ICD-10-CM

## 2025-05-05 DIAGNOSIS — Z12.5 SCREENING FOR PROSTATE CANCER: ICD-10-CM

## 2025-05-05 DIAGNOSIS — Z13.220 SCREENING FOR LIPID DISORDERS: ICD-10-CM

## 2025-05-05 DIAGNOSIS — M25.511 CHRONIC RIGHT SHOULDER PAIN: ICD-10-CM

## 2025-05-05 LAB
25(OH)D3 SERPL-MCNC: 32.8 NG/ML (ref 30–100)
ALBUMIN SERPL-MCNC: 4 G/DL (ref 3.2–4.6)
ALBUMIN/GLOB SERPL: 1.7 (ref 1–1.9)
ALP SERPL-CCNC: 60 U/L (ref 40–129)
ALT SERPL-CCNC: 20 U/L (ref 8–55)
ANION GAP SERPL CALC-SCNC: 11 MMOL/L (ref 7–16)
AST SERPL-CCNC: 19 U/L (ref 15–37)
BASOPHILS # BLD: 0.03 K/UL (ref 0–0.2)
BASOPHILS NFR BLD: 0.8 % (ref 0–2)
BILIRUB SERPL-MCNC: 0.6 MG/DL (ref 0–1.2)
BUN SERPL-MCNC: 15 MG/DL (ref 8–23)
CALCIUM SERPL-MCNC: 10.1 MG/DL (ref 8.8–10.2)
CHLORIDE SERPL-SCNC: 106 MMOL/L (ref 98–107)
CHOLEST SERPL-MCNC: 161 MG/DL (ref 0–200)
CO2 SERPL-SCNC: 26 MMOL/L (ref 20–29)
CREAT SERPL-MCNC: 1.06 MG/DL (ref 0.8–1.3)
DIFFERENTIAL METHOD BLD: ABNORMAL
EOSINOPHIL # BLD: 0.09 K/UL (ref 0–0.8)
EOSINOPHIL NFR BLD: 2.4 % (ref 0.5–7.8)
ERYTHROCYTE [DISTWIDTH] IN BLOOD BY AUTOMATED COUNT: 16 % (ref 11.9–14.6)
GLOBULIN SER CALC-MCNC: 2.4 G/DL (ref 2.3–3.5)
GLUCOSE SERPL-MCNC: 89 MG/DL (ref 70–99)
HCT VFR BLD AUTO: 38.6 % (ref 41.1–50.3)
HDLC SERPL-MCNC: 75 MG/DL (ref 40–60)
HDLC SERPL: 2.1 (ref 0–5)
HGB BLD-MCNC: 12.5 G/DL (ref 13.6–17.2)
IMM GRANULOCYTES # BLD AUTO: 0.01 K/UL (ref 0–0.5)
IMM GRANULOCYTES NFR BLD AUTO: 0.3 % (ref 0–5)
LDLC SERPL CALC-MCNC: 69 MG/DL (ref 0–100)
LYMPHOCYTES # BLD: 1.5 K/UL (ref 0.5–4.6)
LYMPHOCYTES NFR BLD: 39.7 % (ref 13–44)
MCH RBC QN AUTO: 30.6 PG (ref 26.1–32.9)
MCHC RBC AUTO-ENTMCNC: 32.4 G/DL (ref 31.4–35)
MCV RBC AUTO: 94.6 FL (ref 82–102)
MONOCYTES # BLD: 0.46 K/UL (ref 0.1–1.3)
MONOCYTES NFR BLD: 12.2 % (ref 4–12)
NEUTS SEG # BLD: 1.69 K/UL (ref 1.7–8.2)
NEUTS SEG NFR BLD: 44.6 % (ref 43–78)
NRBC # BLD: 0 K/UL (ref 0–0.2)
PLATELET # BLD AUTO: 412 K/UL (ref 150–450)
PMV BLD AUTO: 9.8 FL (ref 9.4–12.3)
POTASSIUM SERPL-SCNC: 5 MMOL/L (ref 3.5–5.1)
PROT SERPL-MCNC: 6.4 G/DL (ref 6.3–8.2)
PSA SERPL-MCNC: 0.8 NG/ML (ref 0–4)
RBC # BLD AUTO: 4.08 M/UL (ref 4.23–5.6)
SODIUM SERPL-SCNC: 142 MMOL/L (ref 136–145)
TRIGL SERPL-MCNC: 83 MG/DL (ref 0–150)
VLDLC SERPL CALC-MCNC: 17 MG/DL (ref 6–23)
WBC # BLD AUTO: 3.8 K/UL (ref 4.3–11.1)

## 2025-05-05 PROCEDURE — 99203 OFFICE O/P NEW LOW 30 MIN: CPT | Performed by: FAMILY MEDICINE

## 2025-05-05 PROCEDURE — 1160F RVW MEDS BY RX/DR IN RCRD: CPT | Performed by: FAMILY MEDICINE

## 2025-05-05 PROCEDURE — 1123F ACP DISCUSS/DSCN MKR DOCD: CPT | Performed by: FAMILY MEDICINE

## 2025-05-05 PROCEDURE — 1159F MED LIST DOCD IN RCRD: CPT | Performed by: FAMILY MEDICINE

## 2025-05-05 RX ORDER — FAMOTIDINE 10 MG
10 TABLET ORAL 2 TIMES DAILY
COMMUNITY

## 2025-05-05 SDOH — ECONOMIC STABILITY: FOOD INSECURITY: WITHIN THE PAST 12 MONTHS, YOU WORRIED THAT YOUR FOOD WOULD RUN OUT BEFORE YOU GOT MONEY TO BUY MORE.: NEVER TRUE

## 2025-05-05 SDOH — ECONOMIC STABILITY: FOOD INSECURITY: WITHIN THE PAST 12 MONTHS, THE FOOD YOU BOUGHT JUST DIDN'T LAST AND YOU DIDN'T HAVE MONEY TO GET MORE.: NEVER TRUE
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
## 2025-05-07 ENCOUNTER — PATIENT MESSAGE (OUTPATIENT)
Dept: PRIMARY CARE CLINIC | Facility: CLINIC | Age: 70
End: 2025-05-07

## 2025-05-07 ENCOUNTER — RESULTS FOLLOW-UP (OUTPATIENT)
Dept: PRIMARY CARE CLINIC | Facility: CLINIC | Age: 70
End: 2025-05-07

## 2025-05-07 DIAGNOSIS — D50.8 OTHER IRON DEFICIENCY ANEMIA: Primary | ICD-10-CM

## 2025-05-07 PROBLEM — D64.9 ABSOLUTE ANEMIA: Status: ACTIVE | Noted: 2025-05-07

## 2025-05-08 NOTE — RESULT ENCOUNTER NOTE
1. Hemoglobin: slightly low at 12.5; hematocrit: slightly low at 38.6. Continue iron tablets. Try to incorporate high iron foods such as:    Fruits  · Figs (dried), 5 figs  Vegetables  · Asparagus (canned), 6 boo  · [place], beet, Swiss chard, or turnip greens, 1 cup  · Dried peas, cooked, ½ cup  · Seaweed, spirulina (dried), ¼ cup  · Spinach, (cooked) ½ cup or (raw) 1 cup  Grains  · Cereals, fortified with iron, 1 cup  · Grits (instant, cooked), fortified with iron, ½ cup  Meats and other protein foods  · Beans (kidney, lima, navy, white), canned or cooked, ½ cup  · Beef or lamb, 3 oz  · Chicken giblets, 3 oz  · Chickpeas (garbanzo beans), ½ cup  · Liver of beef, lamb, or pork, 3 oz  · Oysters (cooked), 3 oz  · Sardines (canned), 3 oz  · Soybeans (boiled), ½ cup  · Tofu (firm), ½ cup    2. Remainder of your labs are good.

## 2025-06-18 ENCOUNTER — TELEPHONE (OUTPATIENT)
Dept: PRIMARY CARE CLINIC | Facility: CLINIC | Age: 70
End: 2025-06-18

## 2025-06-18 DIAGNOSIS — D64.9 ANEMIA, UNSPECIFIED TYPE: Primary | ICD-10-CM

## 2025-06-18 NOTE — TELEPHONE ENCOUNTER
Patient is leaving the country in the next 10 days and he would like to have his Hemoglobin and hematocrit levels recheck were low.  He would like to have the labs before he goes out of the country in the next 10 days. He stated that he has been taking iron as directed, but he would like these labs before he leaves.  Please order labs and let us know that he can be scheduled.

## 2025-06-19 DIAGNOSIS — D64.9 ANEMIA, UNSPECIFIED TYPE: ICD-10-CM

## 2025-06-19 LAB
BASOPHILS # BLD: 0.03 K/UL (ref 0–0.2)
BASOPHILS NFR BLD: 0.7 % (ref 0–2)
DIFFERENTIAL METHOD BLD: ABNORMAL
EOSINOPHIL # BLD: 0.1 K/UL (ref 0–0.8)
EOSINOPHIL NFR BLD: 2.5 % (ref 0.5–7.8)
ERYTHROCYTE [DISTWIDTH] IN BLOOD BY AUTOMATED COUNT: 15.9 % (ref 11.9–14.6)
HCT VFR BLD AUTO: 36.9 % (ref 41.1–50.3)
HGB BLD-MCNC: 12.1 G/DL (ref 13.6–17.2)
IMM GRANULOCYTES # BLD AUTO: 0 K/UL (ref 0–0.5)
IMM GRANULOCYTES NFR BLD AUTO: 0 % (ref 0–5)
LYMPHOCYTES # BLD: 1.31 K/UL (ref 0.5–4.6)
LYMPHOCYTES NFR BLD: 32.4 % (ref 13–44)
MCH RBC QN AUTO: 30.6 PG (ref 26.1–32.9)
MCHC RBC AUTO-ENTMCNC: 32.8 G/DL (ref 31.4–35)
MCV RBC AUTO: 93.4 FL (ref 82–102)
MONOCYTES # BLD: 0.47 K/UL (ref 0.1–1.3)
MONOCYTES NFR BLD: 11.6 % (ref 4–12)
NEUTS SEG # BLD: 2.13 K/UL (ref 1.7–8.2)
NEUTS SEG NFR BLD: 52.8 % (ref 43–78)
NRBC # BLD: 0 K/UL (ref 0–0.2)
PLATELET # BLD AUTO: 402 K/UL (ref 150–450)
PMV BLD AUTO: 10.2 FL (ref 9.4–12.3)
RBC # BLD AUTO: 3.95 M/UL (ref 4.23–5.6)
WBC # BLD AUTO: 4 K/UL (ref 4.3–11.1)

## 2025-06-20 ENCOUNTER — RESULTS FOLLOW-UP (OUTPATIENT)
Dept: PRIMARY CARE CLINIC | Facility: CLINIC | Age: 70
End: 2025-06-20

## 2025-06-20 DIAGNOSIS — D50.8 OTHER IRON DEFICIENCY ANEMIA: Primary | ICD-10-CM

## 2025-06-20 RX ORDER — FERROUS SULFATE 325(65) MG
325 TABLET ORAL
Qty: 90 TABLET | Refills: 3 | Status: SHIPPED | OUTPATIENT
Start: 2025-06-20

## 2025-06-21 NOTE — RESULT ENCOUNTER NOTE
1. Hemoglobin: low at 12.1 (was 12.5) - has anemia. Needs to start iron at least once daily. Will send a prescription to the pharmacy.

## (undated) DEVICE — TUBING, SUCTION, 1/4" X 10', STRAIGHT: Brand: MEDLINE

## (undated) DEVICE — 2.4 MM X 15 INCH DRILL TIP PASSING                                    PIN, STERILE: Brand: ENDOBUTTON

## (undated) DEVICE — ELECTRODE PT RET AD L9FT HI MOIST COND ADH HYDRGEL CORDED

## (undated) DEVICE — DRILL 4.5MM FOR 5.5MM ANCHOR SL-PLUS

## (undated) DEVICE — BNDG,ELSTC,MATRIX,STRL,2"X5YD,LF,HOOK&LP: Brand: MEDLINE

## (undated) DEVICE — PADDING CAST W3INXL4YD COT BLEND MIC PLEAT UNDERCAST SPEC

## (undated) DEVICE — Device

## (undated) DEVICE — ZIMMER® STERILE DISPOSABLE TOURNIQUET CUFF WITH PLC, DUAL PORT, SINGLE BLADDER, 18 IN. (46 CM)

## (undated) DEVICE — HAND PACK: Brand: MEDLINE INDUSTRIES, INC.

## (undated) DEVICE — SUTURE VCRL SZ 3-0 L27IN ABSRB UD L26MM SH 1/2 CIR J416H

## (undated) DEVICE — SUTURE VCRL SZ 0 L27IN ABSRB VLT L26MM CT-2 1/2 CIR J334H

## (undated) DEVICE — TWINFIX 12 INCH SUTURE PASSER, STERILE: Brand: TWINFIX

## (undated) DEVICE — SUTURE N ABSRB ULTRALOOP UHMWPE WHT BLU HS FIBER

## (undated) DEVICE — SUTURE VCRL SZ 0 L27IN ABSRB UD L36MM CP-1 1/2 CIR REV CUT J267H

## (undated) DEVICE — GLOVE ORANGE PI 7 1/2   MSG9075

## (undated) DEVICE — PENCIL ES L3M BTTN SWCH HOLSTER W/ BLDE ELECTRD EDGE

## (undated) DEVICE — SOLUTION IRRIG 1000ML 09% SOD CHL USP PIC PLAS CONTAINER

## (undated) DEVICE — SLING ARM L PCH 9X20IN STRP 2 37IN LT BLU COT FOAM SHLDR PD

## (undated) DEVICE — BNDG,ELSTC,MATRIX,STRL,3"X5YD,LF,HOOK&LP: Brand: MEDLINE

## (undated) DEVICE — STOCKINETTE ORTH W9XL36IN COT 2 PLY HLLW FOR HANDLING LMB

## (undated) DEVICE — DRESSING,GAUZE,XEROFORM,CURAD,1"X8",ST: Brand: CURAD